# Patient Record
Sex: FEMALE | Race: WHITE | NOT HISPANIC OR LATINO
[De-identification: names, ages, dates, MRNs, and addresses within clinical notes are randomized per-mention and may not be internally consistent; named-entity substitution may affect disease eponyms.]

---

## 2017-02-01 ENCOUNTER — RESULT REVIEW (OUTPATIENT)
Age: 37
End: 2017-02-01

## 2017-02-02 ENCOUNTER — OUTPATIENT (OUTPATIENT)
Dept: OUTPATIENT SERVICES | Facility: HOSPITAL | Age: 37
LOS: 1 days | End: 2017-02-02
Payer: COMMERCIAL

## 2017-02-02 DIAGNOSIS — C53.9 MALIGNANT NEOPLASM OF CERVIX UTERI, UNSPECIFIED: ICD-10-CM

## 2017-02-02 PROCEDURE — 88321 CONSLTJ&REPRT SLD PREP ELSWR: CPT

## 2017-02-08 PROBLEM — Z00.00 ENCOUNTER FOR PREVENTIVE HEALTH EXAMINATION: Status: ACTIVE | Noted: 2017-02-08

## 2017-02-13 ENCOUNTER — APPOINTMENT (OUTPATIENT)
Dept: MRI IMAGING | Facility: CLINIC | Age: 37
End: 2017-02-13

## 2017-02-13 ENCOUNTER — OUTPATIENT (OUTPATIENT)
Dept: OUTPATIENT SERVICES | Facility: HOSPITAL | Age: 37
LOS: 1 days | End: 2017-02-13

## 2017-02-13 DIAGNOSIS — Z00.8 ENCOUNTER FOR OTHER GENERAL EXAMINATION: ICD-10-CM

## 2017-02-15 ENCOUNTER — OUTPATIENT (OUTPATIENT)
Dept: OUTPATIENT SERVICES | Facility: HOSPITAL | Age: 37
LOS: 1 days | End: 2017-02-15
Payer: COMMERCIAL

## 2017-02-15 VITALS
RESPIRATION RATE: 16 BRPM | DIASTOLIC BLOOD PRESSURE: 60 MMHG | HEART RATE: 64 BPM | TEMPERATURE: 98 F | SYSTOLIC BLOOD PRESSURE: 98 MMHG | WEIGHT: 185.19 LBS | HEIGHT: 67 IN

## 2017-02-15 DIAGNOSIS — Z98.890 OTHER SPECIFIED POSTPROCEDURAL STATES: Chronic | ICD-10-CM

## 2017-02-15 DIAGNOSIS — C53.9 MALIGNANT NEOPLASM OF CERVIX UTERI, UNSPECIFIED: ICD-10-CM

## 2017-02-15 DIAGNOSIS — Z01.818 ENCOUNTER FOR OTHER PREPROCEDURAL EXAMINATION: ICD-10-CM

## 2017-02-15 LAB
BASOPHILS # BLD AUTO: 0 K/UL — SIGNIFICANT CHANGE UP (ref 0–0.2)
BASOPHILS NFR BLD AUTO: 0.2 % — SIGNIFICANT CHANGE UP (ref 0–2)
BLD GP AB SCN SERPL QL: SIGNIFICANT CHANGE UP
EOSINOPHIL # BLD AUTO: 0.1 K/UL — SIGNIFICANT CHANGE UP (ref 0–0.5)
EOSINOPHIL NFR BLD AUTO: 0.8 % — SIGNIFICANT CHANGE UP (ref 0–6)
HCG SERPL-ACNC: <2 MIU/ML — SIGNIFICANT CHANGE UP
HCT VFR BLD CALC: 45.7 % — SIGNIFICANT CHANGE UP (ref 37–47)
HGB BLD-MCNC: 15.3 G/DL — SIGNIFICANT CHANGE UP (ref 12–16)
LYMPHOCYTES # BLD AUTO: 15.4 % — LOW (ref 20–55)
LYMPHOCYTES # BLD AUTO: 2.2 K/UL — SIGNIFICANT CHANGE UP (ref 1–4.8)
MCHC RBC-ENTMCNC: 31.5 PG — HIGH (ref 27–31)
MCHC RBC-ENTMCNC: 33.5 G/DL — SIGNIFICANT CHANGE UP (ref 32–36)
MCV RBC AUTO: 94.2 FL — SIGNIFICANT CHANGE UP (ref 81–99)
MONOCYTES # BLD AUTO: 0.9 K/UL — HIGH (ref 0–0.8)
MONOCYTES NFR BLD AUTO: 6 % — SIGNIFICANT CHANGE UP (ref 3–10)
NEUTROPHILS # BLD AUTO: 11.1 K/UL — HIGH (ref 1.8–8)
NEUTROPHILS NFR BLD AUTO: 77.3 % — HIGH (ref 37–73)
PLATELET # BLD AUTO: 335 K/UL — SIGNIFICANT CHANGE UP (ref 150–400)
RBC # BLD: 4.85 M/UL — SIGNIFICANT CHANGE UP (ref 4.4–5.2)
RBC # FLD: 12.7 % — SIGNIFICANT CHANGE UP (ref 11–15.6)
TYPE + AB SCN PNL BLD: SIGNIFICANT CHANGE UP
WBC # BLD: 14.3 K/UL — HIGH (ref 4.8–10.8)
WBC # FLD AUTO: 14.3 K/UL — HIGH (ref 4.8–10.8)

## 2017-02-15 PROCEDURE — 86850 RBC ANTIBODY SCREEN: CPT

## 2017-02-15 PROCEDURE — 86900 BLOOD TYPING SEROLOGIC ABO: CPT

## 2017-02-15 PROCEDURE — 85027 COMPLETE CBC AUTOMATED: CPT

## 2017-02-15 PROCEDURE — 84702 CHORIONIC GONADOTROPIN TEST: CPT

## 2017-02-15 PROCEDURE — 86901 BLOOD TYPING SEROLOGIC RH(D): CPT

## 2017-02-15 NOTE — H&P PST ADULT - NSANTHOSAYNRD_GEN_A_CORE
No. MATILDE screening performed.  STOP BANG Legend: 0-2 = LOW Risk; 3-4 = INTERMEDIATE Risk; 5-8 = HIGH Risk

## 2017-02-15 NOTE — H&P PST ADULT - HISTORY OF PRESENT ILLNESS
36F  with abnormal pap 36F  with abnormal pap, has had prior colposcopies and 1 prior conization. Now scheduled for Cold Knife Conization.

## 2017-02-15 NOTE — H&P PST ADULT - FAMILY HISTORY
Father  Still living? No  Family history of brain cancer, Age at diagnosis: 41-50     Sibling  Still living? Yes, Estimated age: Age Unknown  Family history of Hashimoto thyroiditis, Age at diagnosis: Age Unknown

## 2017-02-16 DIAGNOSIS — C53.9 MALIGNANT NEOPLASM OF CERVIX UTERI, UNSPECIFIED: ICD-10-CM

## 2017-02-16 DIAGNOSIS — Z01.818 ENCOUNTER FOR OTHER PREPROCEDURAL EXAMINATION: ICD-10-CM

## 2017-02-27 ENCOUNTER — RESULT REVIEW (OUTPATIENT)
Age: 37
End: 2017-02-27

## 2017-02-28 ENCOUNTER — OUTPATIENT (OUTPATIENT)
Dept: OUTPATIENT SERVICES | Facility: HOSPITAL | Age: 37
LOS: 1 days | End: 2017-02-28
Payer: COMMERCIAL

## 2017-02-28 VITALS
WEIGHT: 182.1 LBS | OXYGEN SATURATION: 97 % | RESPIRATION RATE: 16 BRPM | HEIGHT: 67 IN | TEMPERATURE: 37 F | SYSTOLIC BLOOD PRESSURE: 114 MMHG | HEART RATE: 82 BPM | DIASTOLIC BLOOD PRESSURE: 50 MMHG

## 2017-02-28 VITALS
HEART RATE: 74 BPM | OXYGEN SATURATION: 100 % | TEMPERATURE: 97 F | SYSTOLIC BLOOD PRESSURE: 107 MMHG | RESPIRATION RATE: 14 BRPM | DIASTOLIC BLOOD PRESSURE: 57 MMHG

## 2017-02-28 DIAGNOSIS — Z98.890 OTHER SPECIFIED POSTPROCEDURAL STATES: Chronic | ICD-10-CM

## 2017-02-28 DIAGNOSIS — C53.9 MALIGNANT NEOPLASM OF CERVIX UTERI, UNSPECIFIED: ICD-10-CM

## 2017-02-28 LAB
BLD GP AB SCN SERPL QL: SIGNIFICANT CHANGE UP
TYPE + AB SCN PNL BLD: SIGNIFICANT CHANGE UP

## 2017-02-28 PROCEDURE — 86901 BLOOD TYPING SEROLOGIC RH(D): CPT

## 2017-02-28 PROCEDURE — 86850 RBC ANTIBODY SCREEN: CPT

## 2017-02-28 PROCEDURE — 57520 CONIZATION OF CERVIX: CPT

## 2017-02-28 PROCEDURE — 88341 IMHCHEM/IMCYTCHM EA ADD ANTB: CPT

## 2017-02-28 PROCEDURE — 88307 TISSUE EXAM BY PATHOLOGIST: CPT | Mod: 26

## 2017-02-28 PROCEDURE — 36415 COLL VENOUS BLD VENIPUNCTURE: CPT

## 2017-02-28 PROCEDURE — 88307 TISSUE EXAM BY PATHOLOGIST: CPT

## 2017-02-28 PROCEDURE — 88341 IMHCHEM/IMCYTCHM EA ADD ANTB: CPT | Mod: 26

## 2017-02-28 PROCEDURE — 88342 IMHCHEM/IMCYTCHM 1ST ANTB: CPT | Mod: 26

## 2017-02-28 PROCEDURE — 86900 BLOOD TYPING SEROLOGIC ABO: CPT

## 2017-02-28 PROCEDURE — 88342 IMHCHEM/IMCYTCHM 1ST ANTB: CPT

## 2017-02-28 RX ORDER — SODIUM CHLORIDE 9 MG/ML
3 INJECTION INTRAMUSCULAR; INTRAVENOUS; SUBCUTANEOUS ONCE
Qty: 0 | Refills: 0 | Status: DISCONTINUED | OUTPATIENT
Start: 2017-02-28 | End: 2017-02-28

## 2017-02-28 RX ORDER — ONDANSETRON 8 MG/1
4 TABLET, FILM COATED ORAL ONCE
Qty: 0 | Refills: 0 | Status: DISCONTINUED | OUTPATIENT
Start: 2017-02-28 | End: 2017-02-28

## 2017-02-28 RX ORDER — FENTANYL CITRATE 50 UG/ML
50 INJECTION INTRAVENOUS
Qty: 0 | Refills: 0 | Status: DISCONTINUED | OUTPATIENT
Start: 2017-02-28 | End: 2017-02-28

## 2017-02-28 RX ORDER — SODIUM CHLORIDE 9 MG/ML
1000 INJECTION, SOLUTION INTRAVENOUS
Qty: 0 | Refills: 0 | Status: DISCONTINUED | OUTPATIENT
Start: 2017-02-28 | End: 2017-02-28

## 2017-02-28 RX ORDER — OXYCODONE HYDROCHLORIDE 5 MG/1
1 TABLET ORAL
Qty: 12 | Refills: 0
Start: 2017-02-28 | End: 2017-03-03

## 2017-02-28 RX ORDER — CEFAZOLIN SODIUM 1 G
2000 VIAL (EA) INJECTION ONCE
Qty: 0 | Refills: 0 | Status: COMPLETED | OUTPATIENT
Start: 2017-02-28 | End: 2017-02-28

## 2017-02-28 RX ADMIN — FENTANYL CITRATE 50 MICROGRAM(S): 50 INJECTION INTRAVENOUS at 10:31

## 2017-02-28 RX ADMIN — FENTANYL CITRATE 50 MICROGRAM(S): 50 INJECTION INTRAVENOUS at 10:25

## 2017-02-28 RX ADMIN — Medication 100 MILLIGRAM(S): at 09:35

## 2017-02-28 NOTE — ASU DISCHARGE PLAN (ADULT/PEDIATRIC). - MEDICATION SUMMARY - MEDICATIONS TO TAKE
I will START or STAY ON the medications listed below when I get home from the hospital:    acetaminophen-oxyCODONE 325 mg-5 mg oral tablet  -- 1 tab(s) by mouth every 6 hours, As Needed -for severe pain MDD:4 tabs  -- Caution federal law prohibits the transfer of this drug to any person other  than the person for whom it was prescribed.  May cause drowsiness.  Alcohol may intensify this effect.  Use care when operating dangerous machinery.  This prescription cannot be refilled.  This product contains acetaminophen.  Do not use  with any other product containing acetaminophen to prevent possible liver damage.  Using more of this medication than prescribed may cause serious breathing problems.    -- Indication: For pain

## 2017-03-07 LAB — SURGICAL PATHOLOGY FINAL REPORT - CH: SIGNIFICANT CHANGE UP

## 2017-06-16 ENCOUNTER — RESULT REVIEW (OUTPATIENT)
Age: 37
End: 2017-06-16

## 2018-03-15 ENCOUNTER — RESULT REVIEW (OUTPATIENT)
Age: 38
End: 2018-03-15

## 2019-07-02 PROBLEM — K44.9 DIAPHRAGMATIC HERNIA WITHOUT OBSTRUCTION OR GANGRENE: Chronic | Status: ACTIVE | Noted: 2017-02-15

## 2019-07-10 ENCOUNTER — APPOINTMENT (OUTPATIENT)
Dept: GYNECOLOGIC ONCOLOGY | Facility: CLINIC | Age: 39
End: 2019-07-10
Payer: COMMERCIAL

## 2019-07-10 VITALS
RESPIRATION RATE: 16 BRPM | HEIGHT: 68 IN | DIASTOLIC BLOOD PRESSURE: 83 MMHG | HEART RATE: 101 BPM | OXYGEN SATURATION: 95 % | BODY MASS INDEX: 30.31 KG/M2 | WEIGHT: 200 LBS | SYSTOLIC BLOOD PRESSURE: 134 MMHG

## 2019-07-10 PROCEDURE — 99213 OFFICE O/P EST LOW 20 MIN: CPT

## 2019-07-10 NOTE — ASSESSMENT
[FreeTextEntry1] : I explained that she may be experiencing these symptoms during her cycle due to scaring from her prior cone. Her pelvic exam is normal. Assuming this pap from today is negative I have asked her to return to my office in January for a f/u pap.

## 2019-07-10 NOTE — REASON FOR VISIT
[FreeTextEntry1] : [facility]\par \par [facility] [phone] at [address] [place] [id number]\par

## 2019-07-10 NOTE — END OF VISIT
[FreeTextEntry3] : Written by Whitley Wright, acting as a scribe for Dr. Tj Barrientos.\par This note accurately reflects the work and decision made by me.\par

## 2019-07-10 NOTE — HISTORY OF PRESENT ILLNESS
[FreeTextEntry1] : 37 y/o s/p cold knife conization for micro-invasive cervical cancer on 02/28/17, last seen in my office on 03/15/18 returns today for a pap smear. Pap from 03/2018 was negative, HPV negative. Patient states her mother  passed away of cancer which is why she is over due for her appointment.  She complains of pressure at the start of her menstrual cycle with light bleeding, until the second or third day when it becomes heavy.

## 2019-07-10 NOTE — PROCEDURE
[Cervical Pap] : cervical pap smear  [Other: ___] : [unfilled] [Verbal Consent] : verbal consent was obtained prior to the procedure and is detailed in the patient's record [Patient] : the patient

## 2019-07-10 NOTE — PHYSICAL EXAM
[Normal] : Adnexa(ae): Normal [de-identified] : Whitley Wright MA was present the entire time of gynecological exam

## 2019-07-11 LAB — HPV HIGH+LOW RISK DNA PNL CVX: NOT DETECTED

## 2019-07-15 LAB — CYTOLOGY CVX/VAG DOC THIN PREP: NORMAL

## 2019-10-19 ENCOUNTER — TRANSCRIPTION ENCOUNTER (OUTPATIENT)
Age: 39
End: 2019-10-19

## 2019-10-30 ENCOUNTER — TRANSCRIPTION ENCOUNTER (OUTPATIENT)
Age: 39
End: 2019-10-30

## 2020-03-04 ENCOUNTER — APPOINTMENT (OUTPATIENT)
Dept: GYNECOLOGIC ONCOLOGY | Facility: CLINIC | Age: 40
End: 2020-03-04

## 2021-02-25 ENCOUNTER — APPOINTMENT (OUTPATIENT)
Dept: GYNECOLOGIC ONCOLOGY | Facility: CLINIC | Age: 41
End: 2021-02-25
Payer: MEDICAID

## 2021-02-25 VITALS — BODY MASS INDEX: 30.31 KG/M2 | WEIGHT: 200 LBS | HEIGHT: 68 IN

## 2021-02-25 DIAGNOSIS — Z78.9 OTHER SPECIFIED HEALTH STATUS: ICD-10-CM

## 2021-02-25 PROCEDURE — 76857 US EXAM PELVIC LIMITED: CPT | Mod: 59

## 2021-02-25 PROCEDURE — 93976 VASCULAR STUDY: CPT | Mod: 59

## 2021-02-25 PROCEDURE — 99215 OFFICE O/P EST HI 40 MIN: CPT | Mod: 25

## 2021-02-25 PROCEDURE — 99072 ADDL SUPL MATRL&STAF TM PHE: CPT

## 2021-02-25 PROCEDURE — 76830 TRANSVAGINAL US NON-OB: CPT | Mod: 59

## 2021-03-01 PROBLEM — Z78.9 PATIENT DENIES MEDICAL PROBLEMS: Status: RESOLVED | Noted: 2021-03-01 | Resolved: 2021-03-01

## 2021-03-01 LAB — HPV HIGH+LOW RISK DNA PNL CVX: NOT DETECTED

## 2021-03-01 NOTE — PROCEDURE
[Cervical Pap] : cervical pap smear  [Other: ___] : [unfilled] [Patient] : the patient [Verbal Consent] : verbal consent was obtained prior to the procedure and is detailed in the patient's record

## 2021-03-01 NOTE — HISTORY OF PRESENT ILLNESS
[FreeTextEntry1] : 39yo nulligravida pt with LMP on 2/6/21 known to our service s/p CKC for micro-invasive cervical cancer in February 2017 with Dr. Barrientos. Patient overdue for surveillance, last seen July 2019 where Cervical PAP was performed and WNL. She presents today to see Dr. Collier due to change in insurance and to continue established surveillance care. She reports since last being seen she developed pelvic pain that began in November of 2020. She underwent CT scan of the pelvis on 2/18/21 revealing an enlarged uterus that has lobular contour on the basis of uterine fibroids, no measurements provided. She denies any abnormal bleeding or issues with bowel/bladder habits. \par \par LMammo- plans to get this year\par

## 2021-03-01 NOTE — PHYSICAL EXAM
[Normal] : Bimanual Exam: Normal [Abnormal] : Uterus: Abnormal [de-identified] : large fibroid uterus [de-identified] : Patient was interviewed and examined with chaperone present. Name of Chaperone: Nataliya Shahid PA-C

## 2021-03-01 NOTE — CHIEF COMPLAINT
[FreeTextEntry1] : [facility]\par \par [facility] [phone] at [address] [facility] [address]\par

## 2021-03-01 NOTE — END OF VISIT
[FreeTextEntry3] : Written by Yary Shahid PA-C, acting as scribe for Dr. Liz Collier.\par  [FreeTextEntry2] : This note accurately reflects the work and decisions made by me.\par

## 2021-03-01 NOTE — REVIEW OF SYSTEMS
[Negative] : Musculoskeletal [Hematuria] : no hematuria [Dysuria] : no dysuria [Vaginal Discharge] : no vaginal discharge [Abn Vag Bleeding] : no abnormal vaginal bleeding [Dyspareunia] : no dyspareunia [Incontinence] : no incontinence [FreeTextEntry4] : pelvic pain

## 2021-03-01 NOTE — ASSESSMENT
[FreeTextEntry1] : 41yo s/p CKC for microinvasive cervical cancer in February 2017. \par \par Now with reported pelvic pain x 4 months and fibroid uterus.\par \par I discussed at length with the patient the nature, purpose, risks, benefits, and alternatives of total abdominal hysterectomy and bilateral salpingectomy via a vertical, midline incision.  The patient understands the risks to include (but not be limited to) bowel injury, bleeding (with the possible need for transfusion), bladder or ureteral injury, infections, protracted wound closure, deep venous thrombosis, and sandie-operative death.  She is also aware of  the possibility of  a unrecognized surgical complication with need for subsequent re-exploration.  She understands the implications that removing both ovaries may have on the quality of her life.  She agrees to proceed.  She asked numerous questions which were answered to her satisfaction.  She understands the need for a pre-operative bowel preparation and agrees to comply with our instructions.\par \par

## 2021-03-04 LAB — CYTOLOGY CVX/VAG DOC THIN PREP: NORMAL

## 2021-03-05 ENCOUNTER — OUTPATIENT (OUTPATIENT)
Dept: OUTPATIENT SERVICES | Facility: HOSPITAL | Age: 41
LOS: 1 days | End: 2021-03-05
Payer: MEDICAID

## 2021-03-05 VITALS
WEIGHT: 220.46 LBS | HEIGHT: 68 IN | RESPIRATION RATE: 19 BRPM | HEART RATE: 101 BPM | SYSTOLIC BLOOD PRESSURE: 135 MMHG | TEMPERATURE: 97 F | DIASTOLIC BLOOD PRESSURE: 84 MMHG

## 2021-03-05 DIAGNOSIS — Z01.818 ENCOUNTER FOR OTHER PREPROCEDURAL EXAMINATION: ICD-10-CM

## 2021-03-05 DIAGNOSIS — Z13.89 ENCOUNTER FOR SCREENING FOR OTHER DISORDER: ICD-10-CM

## 2021-03-05 DIAGNOSIS — Z98.890 OTHER SPECIFIED POSTPROCEDURAL STATES: Chronic | ICD-10-CM

## 2021-03-05 DIAGNOSIS — Z90.89 ACQUIRED ABSENCE OF OTHER ORGANS: Chronic | ICD-10-CM

## 2021-03-05 DIAGNOSIS — Z29.9 ENCOUNTER FOR PROPHYLACTIC MEASURES, UNSPECIFIED: ICD-10-CM

## 2021-03-05 DIAGNOSIS — D25.9 LEIOMYOMA OF UTERUS, UNSPECIFIED: ICD-10-CM

## 2021-03-05 LAB
ALBUMIN SERPL ELPH-MCNC: 4.5 G/DL — SIGNIFICANT CHANGE UP (ref 3.3–5.2)
ALP SERPL-CCNC: 124 U/L — HIGH (ref 40–120)
ALT FLD-CCNC: 14 U/L — SIGNIFICANT CHANGE UP
ANION GAP SERPL CALC-SCNC: 13 MMOL/L — SIGNIFICANT CHANGE UP (ref 5–17)
AST SERPL-CCNC: 18 U/L — SIGNIFICANT CHANGE UP
BASOPHILS # BLD AUTO: 0.07 K/UL — SIGNIFICANT CHANGE UP (ref 0–0.2)
BASOPHILS NFR BLD AUTO: 0.5 % — SIGNIFICANT CHANGE UP (ref 0–2)
BILIRUB SERPL-MCNC: 0.3 MG/DL — LOW (ref 0.4–2)
BLD GP AB SCN SERPL QL: SIGNIFICANT CHANGE UP
BUN SERPL-MCNC: 12 MG/DL — SIGNIFICANT CHANGE UP (ref 8–20)
CALCIUM SERPL-MCNC: 9.4 MG/DL — SIGNIFICANT CHANGE UP (ref 8.6–10.2)
CHLORIDE SERPL-SCNC: 104 MMOL/L — SIGNIFICANT CHANGE UP (ref 98–107)
CO2 SERPL-SCNC: 24 MMOL/L — SIGNIFICANT CHANGE UP (ref 22–29)
CREAT SERPL-MCNC: 0.62 MG/DL — SIGNIFICANT CHANGE UP (ref 0.5–1.3)
EOSINOPHIL # BLD AUTO: 0.13 K/UL — SIGNIFICANT CHANGE UP (ref 0–0.5)
EOSINOPHIL NFR BLD AUTO: 1 % — SIGNIFICANT CHANGE UP (ref 0–6)
GLUCOSE SERPL-MCNC: 91 MG/DL — SIGNIFICANT CHANGE UP (ref 70–99)
HCG SERPL-ACNC: <4 MIU/ML — SIGNIFICANT CHANGE UP
HCT VFR BLD CALC: 48.5 % — HIGH (ref 34.5–45)
HGB BLD-MCNC: 15.8 G/DL — HIGH (ref 11.5–15.5)
IMM GRANULOCYTES NFR BLD AUTO: 0.5 % — SIGNIFICANT CHANGE UP (ref 0–1.5)
LYMPHOCYTES # BLD AUTO: 1.89 K/UL — SIGNIFICANT CHANGE UP (ref 1–3.3)
LYMPHOCYTES # BLD AUTO: 14.5 % — SIGNIFICANT CHANGE UP (ref 13–44)
MCHC RBC-ENTMCNC: 31 PG — SIGNIFICANT CHANGE UP (ref 27–34)
MCHC RBC-ENTMCNC: 32.6 GM/DL — SIGNIFICANT CHANGE UP (ref 32–36)
MCV RBC AUTO: 95.1 FL — SIGNIFICANT CHANGE UP (ref 80–100)
MONOCYTES # BLD AUTO: 0.55 K/UL — SIGNIFICANT CHANGE UP (ref 0–0.9)
MONOCYTES NFR BLD AUTO: 4.2 % — SIGNIFICANT CHANGE UP (ref 2–14)
NEUTROPHILS # BLD AUTO: 10.28 K/UL — HIGH (ref 1.8–7.4)
NEUTROPHILS NFR BLD AUTO: 79.3 % — HIGH (ref 43–77)
PLATELET # BLD AUTO: 332 K/UL — SIGNIFICANT CHANGE UP (ref 150–400)
POTASSIUM SERPL-MCNC: 4.5 MMOL/L — SIGNIFICANT CHANGE UP (ref 3.5–5.3)
POTASSIUM SERPL-SCNC: 4.5 MMOL/L — SIGNIFICANT CHANGE UP (ref 3.5–5.3)
PROT SERPL-MCNC: 7.4 G/DL — SIGNIFICANT CHANGE UP (ref 6.6–8.7)
RBC # BLD: 5.1 M/UL — SIGNIFICANT CHANGE UP (ref 3.8–5.2)
RBC # FLD: 12.6 % — SIGNIFICANT CHANGE UP (ref 10.3–14.5)
SODIUM SERPL-SCNC: 140 MMOL/L — SIGNIFICANT CHANGE UP (ref 135–145)
WBC # BLD: 12.99 K/UL — HIGH (ref 3.8–10.5)
WBC # FLD AUTO: 12.99 K/UL — HIGH (ref 3.8–10.5)

## 2021-03-05 PROCEDURE — 71046 X-RAY EXAM CHEST 2 VIEWS: CPT | Mod: 26

## 2021-03-05 PROCEDURE — G0463: CPT

## 2021-03-05 PROCEDURE — 93005 ELECTROCARDIOGRAM TRACING: CPT

## 2021-03-05 PROCEDURE — 93010 ELECTROCARDIOGRAM REPORT: CPT

## 2021-03-05 PROCEDURE — 71046 X-RAY EXAM CHEST 2 VIEWS: CPT

## 2021-03-05 RX ORDER — CEFAZOLIN SODIUM 1 G
2000 VIAL (EA) INJECTION ONCE
Refills: 0 | Status: DISCONTINUED | OUTPATIENT
Start: 2021-03-18 | End: 2021-03-20

## 2021-03-05 RX ORDER — METRONIDAZOLE 500 MG
500 TABLET ORAL ONCE
Refills: 0 | Status: DISCONTINUED | OUTPATIENT
Start: 2021-03-18 | End: 2021-03-20

## 2021-03-05 NOTE — H&P PST ADULT - NSICDXFAMILYHX_GEN_ALL_CORE_FT
FAMILY HISTORY:  Father  Still living? No  Family history of brain cancer, Age at diagnosis: 41-50    Sibling  Still living? Yes, Estimated age: Age Unknown  Family history of Hashimoto thyroiditis, Age at diagnosis: Age Unknown     FAMILY HISTORY:  Father  Still living? No  Family history of brain cancer, Age at diagnosis: 41-50    Mother  Still living? No  FH: lung cancer, Age at diagnosis: Age Unknown    Sibling  Still living? Yes, Estimated age: Age Unknown  Family history of Hashimoto thyroiditis, Age at diagnosis: Age Unknown

## 2021-03-05 NOTE — H&P PST ADULT - NSICDXPROBLEM_GEN_ALL_CORE_FT
PROBLEM DIAGNOSES  Problem: Leiomyoma of uterus  Assessment and Plan: Scheduled for total abdominal hysterectomy, bilateral salpingectomy on 3/18/2021  Medical clearance     Problem: Need for prophylactic measure  Assessment and Plan: Caprini 3- Moderate risk  Primary team please assess need for DVT prophylaxis     Problem: Screening for substance abuse  Assessment and Plan: ORT 1

## 2021-03-05 NOTE — H&P PST ADULT - ASSESSMENT
41 y/o female scheduled for total abdomina; hysterectyomy, bilateral salpingectomy 3/18/2021 41 y/o female scheduled for total abdomina; hysterectyomy, bilateral salpingectomy 3/18/2021      OPIOID RISK TOOL    DOMENICO EACH BOX THAT APPLIES AND ADD TOTALS AT THE END    FAMILY HISTORY OF SUBSTANCE ABUSE                 FEMALE         MALE                                                Alcohol                             [  ]1 pt          [  ]3pts                                               Illegal Durgs                     [  ]2 pts        [  ]3pts                                               Rx Drugs                           [  ]4 pts        [  ]4 pts    PERSONAL HISTORY OF SUBSTANCE ABUSE                                                                                          Alcohol                             [  ]3 pts       [  ]3 pts                                               Illegal Drugs                     [  ]4 pts        [  ]4 pts                                               Rx Drugs                           [  ]5 pts        [  ]5 pts    AGE BETWEEN 16-45 YEARS                                      [x  ]1 pt         [  ]1 pt    HISTORY OF PREADOLESCENT   SEXUAL ABUSE                                                             [  ]3 pts        [  ]0pts    PSYCHOLOGICAL DISEASE                     ADD, OCD, Bipolar, Schizophrenia        [  ]2 pts         [  ]2 pts                      Depression                                               [  ]1 pt           [  ]1 pt           SCORING TOTAL   (add numbers and type here)              (1)                                     A score of 3 or lower indicated LOW risk for future opioid abuse  A score of 4 to 7 indicated moderate risk for future opioid abuse  A score of 8 or higher indicates a high risk for opioid abuse    CAPRINI SCORE [CLOT]    AGE RELATED RISK FACTORS                                                       MOBILITY RELATED FACTORS  [ ] Age 41-60 years                                            (1 Point)                  [ ] Bed rest                                                        (1 Point)  [ ] Age: 61-74 years                                           (2 Points)                 [ ] Plaster cast                                                   (2 Points)  [ ] Age= 75 years                                              (3 Points)                 [ ] Bed bound for more than 72 hours                 (2 Points)    DISEASE RELATED RISK FACTORS                                               GENDER SPECIFIC FACTORS  [x ] Edema in the lower extremities                       (1 Point)                  [ ] Pregnancy                                                     (1 Point)  [ ] Varicose veins                                               (1 Point)                  [ ] Post-partum < 6 weeks                                   (1 Point)             [x ] BMI > 25 Kg/m2                                            (1 Point)                  [ ] Hormonal therapy  or oral contraception          (1 Point)                 [ ] Sepsis (in the previous month)                        (1 Point)                  [ ] History of pregnancy complications                 (1 point)  [ ] Pneumonia or serious lung disease                                               [ ] Unexplained or recurrent                     (1 Point)           (in the previous month)                               (1 Point)  [ ] Abnormal pulmonary function test                     (1 Point)                 SURGERY RELATED RISK FACTORS  [ ] Acute myocardial infarction                              (1 Point)                 [ ]  Section                                             (1 Point)  [ ] Congestive heart failure (in the previous month)  (1 Point)               [ ] Minor surgery                                                  (1 Point)   [ ] Inflammatory bowel disease                             (1 Point)                 [ ] Arthroscopic surgery                                        (2 Points)  [ ] Central venous access                                      (2 Points)                [x ] General surgery lasting more than 45 minutes   (2 Points)       [ ] Stroke (in the previous month)                          (5 Points)               [ ] Elective arthroplasty                                         (5 Points)                                                                                                                                               HEMATOLOGY RELATED FACTORS                                                 TRAUMA RELATED RISK FACTORS  [ ] Prior episodes of VTE                                     (3 Points)                [ ] Fracture of the hip, pelvis, or leg                       (5 Points)  [ ] Positive family history for VTE                         (3 Points)                 [ ] Acute spinal cord injury (in the previous month)  (5 Points)  [ ] Prothrombin 68581 A                                     (3 Points)                 [ ] Paralysis  (less than 1 month)                             (5 Points)  [ ] Factor V Leiden                                             (3 Points)                  [ ] Multiple Trauma within 1 month                        (5 Points)  [ ] Lupus anticoagulants                                     (3 Points)                                                           [ ] Anticardiolipin antibodies                               (3 Points)                                                       [ ] High homocysteine in the blood                      (3 Points)                                             [ ] Other congenital or acquired thrombophilia      (3 Points)                                                [ ] Heparin induced thrombocytopenia                  (3 Points)                                          Total Score [  3        ]    Caprini Score 0 - 2:  Low Risk, No VTE Prophylaxis required for most patients, encourage ambulation  Caprini Score 3 - 6:  At Risk, pharmacologic VTE prophylaxis is indicated for most patients (in the absence of a contraindication)  Caprini Score Greater than or = 7:  High Risk, pharmacologic VTE prophylaxis is indicated for most patients (in the absence of a contraindication) 39 y/o female scheduled for total abdomina; hysterectyomy, bilateral salpingectomy 3/18/2021. Pt called today, notified of dental clearance as per anesthesiologist request due to pt report of loose tooth. Dr. Collier's office also notified spoke to Phoebe today 3/8/21. Pt also scheduled for PCP apt on 3/9/21.      OPIOID RISK TOOL    DOMENICO EACH BOX THAT APPLIES AND ADD TOTALS AT THE END    FAMILY HISTORY OF SUBSTANCE ABUSE                 FEMALE         MALE                                                Alcohol                             [  ]1 pt          [  ]3pts                                               Illegal Durgs                     [  ]2 pts        [  ]3pts                                               Rx Drugs                           [  ]4 pts        [  ]4 pts    PERSONAL HISTORY OF SUBSTANCE ABUSE                                                                                          Alcohol                             [  ]3 pts       [  ]3 pts                                               Illegal Drugs                     [  ]4 pts        [  ]4 pts                                               Rx Drugs                           [  ]5 pts        [  ]5 pts    AGE BETWEEN 16-45 YEARS                                      [x  ]1 pt         [  ]1 pt    HISTORY OF PREADOLESCENT   SEXUAL ABUSE                                                             [  ]3 pts        [  ]0pts    PSYCHOLOGICAL DISEASE                     ADD, OCD, Bipolar, Schizophrenia        [  ]2 pts         [  ]2 pts                      Depression                                               [  ]1 pt           [  ]1 pt           SCORING TOTAL   (add numbers and type here)              (1)                                     A score of 3 or lower indicated LOW risk for future opioid abuse  A score of 4 to 7 indicated moderate risk for future opioid abuse  A score of 8 or higher indicates a high risk for opioid abuse    CAPRINI SCORE [CLOT]    AGE RELATED RISK FACTORS                                                       MOBILITY RELATED FACTORS  [ ] Age 41-60 years                                            (1 Point)                  [ ] Bed rest                                                        (1 Point)  [ ] Age: 61-74 years                                           (2 Points)                 [ ] Plaster cast                                                   (2 Points)  [ ] Age= 75 years                                              (3 Points)                 [ ] Bed bound for more than 72 hours                 (2 Points)    DISEASE RELATED RISK FACTORS                                               GENDER SPECIFIC FACTORS  [x ] Edema in the lower extremities                       (1 Point)                  [ ] Pregnancy                                                     (1 Point)  [ ] Varicose veins                                               (1 Point)                  [ ] Post-partum < 6 weeks                                   (1 Point)             [x ] BMI > 25 Kg/m2                                            (1 Point)                  [ ] Hormonal therapy  or oral contraception          (1 Point)                 [ ] Sepsis (in the previous month)                        (1 Point)                  [ ] History of pregnancy complications                 (1 point)  [ ] Pneumonia or serious lung disease                                               [ ] Unexplained or recurrent                     (1 Point)           (in the previous month)                               (1 Point)  [ ] Abnormal pulmonary function test                     (1 Point)                 SURGERY RELATED RISK FACTORS  [ ] Acute myocardial infarction                              (1 Point)                 [ ]  Section                                             (1 Point)  [ ] Congestive heart failure (in the previous month)  (1 Point)               [ ] Minor surgery                                                  (1 Point)   [ ] Inflammatory bowel disease                             (1 Point)                 [ ] Arthroscopic surgery                                        (2 Points)  [ ] Central venous access                                      (2 Points)                [x ] General surgery lasting more than 45 minutes   (2 Points)       [ ] Stroke (in the previous month)                          (5 Points)               [ ] Elective arthroplasty                                         (5 Points)                                                                                                                                               HEMATOLOGY RELATED FACTORS                                                 TRAUMA RELATED RISK FACTORS  [ ] Prior episodes of VTE                                     (3 Points)                [ ] Fracture of the hip, pelvis, or leg                       (5 Points)  [ ] Positive family history for VTE                         (3 Points)                 [ ] Acute spinal cord injury (in the previous month)  (5 Points)  [ ] Prothrombin 36647 A                                     (3 Points)                 [ ] Paralysis  (less than 1 month)                             (5 Points)  [ ] Factor V Leiden                                             (3 Points)                  [ ] Multiple Trauma within 1 month                        (5 Points)  [ ] Lupus anticoagulants                                     (3 Points)                                                           [ ] Anticardiolipin antibodies                               (3 Points)                                                       [ ] High homocysteine in the blood                      (3 Points)                                             [ ] Other congenital or acquired thrombophilia      (3 Points)                                                [ ] Heparin induced thrombocytopenia                  (3 Points)                                          Total Score [  3        ]    Caprini Score 0 - 2:  Low Risk, No VTE Prophylaxis required for most patients, encourage ambulation  Caprini Score 3 - 6:  At Risk, pharmacologic VTE prophylaxis is indicated for most patients (in the absence of a contraindication)  Caprini Score Greater than or = 7:  High Risk, pharmacologic VTE prophylaxis is indicated for most patients (in the absence of a contraindication)

## 2021-03-05 NOTE — H&P PST ADULT - HISTORY OF PRESENT ILLNESS
39 y/o female presents to PST today. Patient reports a history of  39 y/o female presents to [facility] today. Patient reports a history of pelvic pressure and pain spast 6 months and have been following up. Patient was diagnosed with fibroids. Denies any abnormal vaginal bleeding. Last mestrual cycle 3/1/2021. Denies any fever, chills chest pain SON 41 y/o female presents to [facility] today. Patient reports a history of pelvic pressure and pain for the past 6 months and has been following up. Patient was recently diagnosed with fibroid February 2021. Denies any abnormal vaginal bleeding. Last menstrual cycle 3/1/2021. Denies any fever, chills chest pain, SOB. Scheduled for total abdominal hysterectomy, bilateral salpingectomy 3/18/2021.

## 2021-03-05 NOTE — H&P PST ADULT - LAB RESULTS AND INTERPRETATION
Abnormal lab results reported to Dr. Collier 3/5/2021 Abnormal lab results reported to Dr. Collier 3/5/2021  3/8/21: Pt PCP office called today, notified of abnormal lab results, spoke with Matilda in PCP office, copies of lab faxed.

## 2021-03-05 NOTE — H&P PST ADULT - NSICDXPASTSURGICALHX_GEN_ALL_CORE_FT
PAST SURGICAL HISTORY:  History of conization of cervix 2017    History of dilation and curettage     History of eye surgery     History of tonsillectomy 1988

## 2021-03-14 DIAGNOSIS — Z01.818 ENCOUNTER FOR OTHER PREPROCEDURAL EXAMINATION: ICD-10-CM

## 2021-03-15 ENCOUNTER — APPOINTMENT (OUTPATIENT)
Dept: DISASTER EMERGENCY | Facility: CLINIC | Age: 41
End: 2021-03-15

## 2021-03-16 LAB — SARS-COV-2 N GENE NPH QL NAA+PROBE: NOT DETECTED

## 2021-03-17 ENCOUNTER — FORM ENCOUNTER (OUTPATIENT)
Age: 41
End: 2021-03-17

## 2021-03-18 ENCOUNTER — RESULT REVIEW (OUTPATIENT)
Age: 41
End: 2021-03-18

## 2021-03-18 ENCOUNTER — INPATIENT (INPATIENT)
Facility: HOSPITAL | Age: 41
LOS: 1 days | Discharge: HOME | DRG: 743 | End: 2021-03-20
Attending: OBSTETRICS & GYNECOLOGY | Admitting: OBSTETRICS & GYNECOLOGY
Payer: MEDICAID

## 2021-03-18 VITALS
RESPIRATION RATE: 15 BRPM | DIASTOLIC BLOOD PRESSURE: 56 MMHG | SYSTOLIC BLOOD PRESSURE: 130 MMHG | TEMPERATURE: 98 F | HEIGHT: 68 IN | WEIGHT: 220.46 LBS | OXYGEN SATURATION: 96 % | HEART RATE: 88 BPM

## 2021-03-18 DIAGNOSIS — D25.9 LEIOMYOMA OF UTERUS, UNSPECIFIED: ICD-10-CM

## 2021-03-18 DIAGNOSIS — Z98.890 OTHER SPECIFIED POSTPROCEDURAL STATES: Chronic | ICD-10-CM

## 2021-03-18 DIAGNOSIS — Z90.89 ACQUIRED ABSENCE OF OTHER ORGANS: Chronic | ICD-10-CM

## 2021-03-18 LAB
GLUCOSE BLDC GLUCOMTR-MCNC: 76 MG/DL — SIGNIFICANT CHANGE UP (ref 70–99)
GLUCOSE BLDC GLUCOMTR-MCNC: 86 MG/DL — SIGNIFICANT CHANGE UP (ref 70–99)
GLUCOSE BLDC GLUCOMTR-MCNC: 92 MG/DL — SIGNIFICANT CHANGE UP (ref 70–99)

## 2021-03-18 PROCEDURE — 88305 TISSUE EXAM BY PATHOLOGIST: CPT | Mod: 26

## 2021-03-18 PROCEDURE — 88307 TISSUE EXAM BY PATHOLOGIST: CPT | Mod: 26

## 2021-03-18 PROCEDURE — 58150 TOTAL HYSTERECTOMY: CPT | Mod: 80

## 2021-03-18 PROCEDURE — 58150 TOTAL HYSTERECTOMY: CPT

## 2021-03-18 RX ORDER — ENOXAPARIN SODIUM 100 MG/ML
40 INJECTION SUBCUTANEOUS DAILY
Refills: 0 | Status: DISCONTINUED | OUTPATIENT
Start: 2021-03-18 | End: 2021-03-20

## 2021-03-18 RX ORDER — CEFAZOLIN SODIUM 1 G
2000 VIAL (EA) INJECTION ONCE
Refills: 0 | Status: DISCONTINUED | OUTPATIENT
Start: 2021-03-18 | End: 2021-03-18

## 2021-03-18 RX ORDER — SODIUM CHLORIDE 9 MG/ML
1000 INJECTION, SOLUTION INTRAVENOUS
Refills: 0 | Status: DISCONTINUED | OUTPATIENT
Start: 2021-03-18 | End: 2021-03-18

## 2021-03-18 RX ORDER — CEFAZOLIN SODIUM 1 G
2000 VIAL (EA) INJECTION ONCE
Refills: 0 | Status: COMPLETED | OUTPATIENT
Start: 2021-03-18 | End: 2021-03-18

## 2021-03-18 RX ORDER — ONDANSETRON 8 MG/1
4 TABLET, FILM COATED ORAL EVERY 6 HOURS
Refills: 0 | Status: DISCONTINUED | OUTPATIENT
Start: 2021-03-18 | End: 2021-03-20

## 2021-03-18 RX ORDER — HYDROMORPHONE HYDROCHLORIDE 2 MG/ML
30 INJECTION INTRAMUSCULAR; INTRAVENOUS; SUBCUTANEOUS
Refills: 0 | Status: DISCONTINUED | OUTPATIENT
Start: 2021-03-18 | End: 2021-03-19

## 2021-03-18 RX ORDER — METRONIDAZOLE 500 MG
500 TABLET ORAL ONCE
Refills: 0 | Status: COMPLETED | OUTPATIENT
Start: 2021-03-18 | End: 2021-03-18

## 2021-03-18 RX ORDER — FENTANYL CITRATE 50 UG/ML
50 INJECTION INTRAVENOUS
Refills: 0 | Status: DISCONTINUED | OUTPATIENT
Start: 2021-03-18 | End: 2021-03-18

## 2021-03-18 RX ORDER — ACETAMINOPHEN 500 MG
975 TABLET ORAL EVERY 6 HOURS
Refills: 0 | Status: COMPLETED | OUTPATIENT
Start: 2021-03-18 | End: 2022-02-14

## 2021-03-18 RX ORDER — ONDANSETRON 8 MG/1
4 TABLET, FILM COATED ORAL EVERY 6 HOURS
Refills: 0 | Status: DISCONTINUED | OUTPATIENT
Start: 2021-03-18 | End: 2021-03-18

## 2021-03-18 RX ORDER — DEXTROSE MONOHYDRATE, SODIUM CHLORIDE, AND POTASSIUM CHLORIDE 50; .745; 4.5 G/1000ML; G/1000ML; G/1000ML
1000 INJECTION, SOLUTION INTRAVENOUS
Refills: 0 | Status: DISCONTINUED | OUTPATIENT
Start: 2021-03-18 | End: 2021-03-19

## 2021-03-18 RX ORDER — SODIUM CHLORIDE 9 MG/ML
3 INJECTION INTRAMUSCULAR; INTRAVENOUS; SUBCUTANEOUS EVERY 8 HOURS
Refills: 0 | Status: DISCONTINUED | OUTPATIENT
Start: 2021-03-18 | End: 2021-03-18

## 2021-03-18 RX ORDER — KETOROLAC TROMETHAMINE 30 MG/ML
30 SYRINGE (ML) INJECTION EVERY 6 HOURS
Refills: 0 | Status: DISCONTINUED | OUTPATIENT
Start: 2021-03-18 | End: 2021-03-20

## 2021-03-18 RX ORDER — NALOXONE HYDROCHLORIDE 4 MG/.1ML
0.1 SPRAY NASAL
Refills: 0 | Status: DISCONTINUED | OUTPATIENT
Start: 2021-03-18 | End: 2021-03-20

## 2021-03-18 RX ADMIN — Medication 30 MILLIGRAM(S): at 18:27

## 2021-03-18 RX ADMIN — Medication 100 MILLIGRAM(S): at 22:18

## 2021-03-18 RX ADMIN — FENTANYL CITRATE 50 MICROGRAM(S): 50 INJECTION INTRAVENOUS at 15:30

## 2021-03-18 RX ADMIN — Medication 30 MILLIGRAM(S): at 18:42

## 2021-03-18 RX ADMIN — HYDROMORPHONE HYDROCHLORIDE 30 MILLILITER(S): 2 INJECTION INTRAMUSCULAR; INTRAVENOUS; SUBCUTANEOUS at 19:08

## 2021-03-18 RX ADMIN — DEXTROSE MONOHYDRATE, SODIUM CHLORIDE, AND POTASSIUM CHLORIDE 125 MILLILITER(S): 50; .745; 4.5 INJECTION, SOLUTION INTRAVENOUS at 18:28

## 2021-03-18 RX ADMIN — FENTANYL CITRATE 50 MICROGRAM(S): 50 INJECTION INTRAVENOUS at 15:45

## 2021-03-18 RX ADMIN — ENOXAPARIN SODIUM 40 MILLIGRAM(S): 100 INJECTION SUBCUTANEOUS at 20:02

## 2021-03-18 RX ADMIN — HYDROMORPHONE HYDROCHLORIDE 30 MILLILITER(S): 2 INJECTION INTRAMUSCULAR; INTRAVENOUS; SUBCUTANEOUS at 16:40

## 2021-03-18 RX ADMIN — Medication 100 MILLIGRAM(S): at 20:02

## 2021-03-18 RX ADMIN — HYDROMORPHONE HYDROCHLORIDE 30 MILLILITER(S): 2 INJECTION INTRAMUSCULAR; INTRAVENOUS; SUBCUTANEOUS at 17:10

## 2021-03-18 RX ADMIN — HYDROMORPHONE HYDROCHLORIDE 30 MILLILITER(S): 2 INJECTION INTRAMUSCULAR; INTRAVENOUS; SUBCUTANEOUS at 15:43

## 2021-03-18 NOTE — BRIEF OPERATIVE NOTE - NSICDXBRIEFPROCEDURE_GEN_ALL_CORE_FT
PROCEDURES:  Oopherectomy 18-Mar-2021 15:08:27 left Savanna Martinez  Hysterectomy, total, abdominal, with salpingectomy 18-Mar-2021 15:08:03  Savanna Martinez

## 2021-03-18 NOTE — BRIEF OPERATIVE NOTE - OPERATION/FINDINGS
10 weeks sized uterus with fundal fibroid and a 10cm cervical fibroid, small posterior fibroids; b/l ovaries adhered to posterior cervix; endometriomas in bilateral ovaries, left more than right

## 2021-03-18 NOTE — CHART NOTE - NSCHARTNOTEFT_GEN_A_CORE
GYNECOLOGIC ONCOLOGY PROGRESS NOTE    POD# 0 s/p DANA BSLO for fibroid uterus w/ adherent L ovary.     PROBLEMS:  Leiomyoma of uterus  Need for prophylactic measure    Pt seen and examined at bedside in 2E    SUBJECTIVE:  Patient is without complaints.  Pain well-controlled on PCA  Flatus: no  Denies Nausea, Vomiting or Diarrhea.  Denies shortness of breath, chest pain or dyspnea on exertion.  Tolerating diet.    OBJECTIVE:     VITALS:  T(F): 98.1 (03-18-21 @ 19:32), Max: 98.6 (03-18-21 @ 17:05)  HR: 77 (03-18-21 @ 19:32) (73 - 92)  BP: 115/73 (03-18-21 @ 19:32) (103/62 - 130/56)  RR: 18 (03-18-21 @ 19:32) (10 - 18)  SpO2: 100% (03-18-21 @ 19:32) (95% - 100%)    I&O's Summary    18 Mar 2021 07:01  -  18 Mar 2021 20:55  --------------------------------------------------------  IN: 150 mL / OUT: 170 mL / NET: -20 mL      MEDICATIONS  (STANDING):  ceFAZolin   IVPB 2000 milliGRAM(s) IV Intermittent once  ceFAZolin  Injectable. 2000 milliGRAM(s) IV Push once  dextrose 5% + sodium chloride 0.45% with potassium chloride 20 mEq/L 1000 milliLiter(s) (125 mL/Hr) IV Continuous <Continuous>  enoxaparin Injectable 40 milliGRAM(s) SubCutaneous daily  HYDROmorphone PCA (1 mG/mL) 30 milliLiter(s) PCA Continuous PCA Continuous  ketorolac   Injectable 30 milliGRAM(s) IV Push every 6 hours  metroNIDAZOLE  IVPB 500 milliGRAM(s) IV Intermittent once    MEDICATIONS  (PRN):  acetaminophen   Tablet .. 975 milliGRAM(s) Oral every 6 hours PRN Mild Pain (1 - 3)  naloxone Injectable 0.1 milliGRAM(s) IV Push every 3 minutes PRN For ANY of the following changes in patient status:  A. RR LESS THAN 10 breaths per minute, B. Oxygen saturation LESS THAN 90%, C. Sedation score of 6  ondansetron Injectable 4 milliGRAM(s) IV Push every 6 hours PRN Nausea and/or Vomiting      Physical Exam:  Constitutional: NAD, AAOx3  Pulmonary: clear to auscultation bilaterally   Cardiovascular: Regular rate and rhythm   Abdomen: soft, non-tender, non-distended, normal bowel sounds  Extremities: no lower extremity edema or calve tenderness, Torito's sign negative.  Incision: Clean, dry, intact. Covered by island dressing. Without signs of infection or hernia.    LABS:  AM labs ordered    A/P - 41yo POD# 0 s/p DANA BSLO for fibroid uterus w/ adherent L ovary.   Cardiac: No cardiac issues, BP well controlled   Pulmonary: no active disease, incentive spirometer at bedside.   Neuro: pain well controlled with current regimen - PCA.  Endo: no active disease    GI: tolerating regular diet   : urena in place.  ID: ancef +flagl x1 post-op   Heme: SCDs when not ambulating, Lovenox for VTE prophylaxis. AM labs.   Skin: no active disease   Psych: no active problems   FEN: IVF at 125cc/hr, will discontinue once PO intake is adequate.  AM labs.

## 2021-03-18 NOTE — ASU PREOP CHECKLIST - DNR CLARIFICATION FORM COMPLETED
Pt [place] with mother from airport, pt states she was having an episode of dizziness and twitching during sleep. The patients friend states she found her friend \"out of it\" with her eyes rolling in the back of the head. Pt states she does not remember and of the events that happened on the plane.  Pt currently axo 4   n/a

## 2021-03-19 ENCOUNTER — TRANSCRIPTION ENCOUNTER (OUTPATIENT)
Age: 41
End: 2021-03-19

## 2021-03-19 LAB
ANION GAP SERPL CALC-SCNC: 12 MMOL/L — SIGNIFICANT CHANGE UP (ref 5–17)
BASOPHILS # BLD AUTO: 0 K/UL — SIGNIFICANT CHANGE UP (ref 0–0.2)
BASOPHILS NFR BLD AUTO: 0 % — SIGNIFICANT CHANGE UP (ref 0–2)
BUN SERPL-MCNC: 8 MG/DL — SIGNIFICANT CHANGE UP (ref 8–20)
CALCIUM SERPL-MCNC: 8.2 MG/DL — LOW (ref 8.6–10.2)
CHLORIDE SERPL-SCNC: 105 MMOL/L — SIGNIFICANT CHANGE UP (ref 98–107)
CO2 SERPL-SCNC: 20 MMOL/L — LOW (ref 22–29)
CREAT SERPL-MCNC: 0.59 MG/DL — SIGNIFICANT CHANGE UP (ref 0.5–1.3)
EOSINOPHIL # BLD AUTO: 0 K/UL — SIGNIFICANT CHANGE UP (ref 0–0.5)
EOSINOPHIL NFR BLD AUTO: 0 % — SIGNIFICANT CHANGE UP (ref 0–6)
GIANT PLATELETS BLD QL SMEAR: PRESENT — SIGNIFICANT CHANGE UP
GLUCOSE SERPL-MCNC: 157 MG/DL — HIGH (ref 70–99)
HCT VFR BLD CALC: 40.4 % — SIGNIFICANT CHANGE UP (ref 34.5–45)
HGB BLD-MCNC: 13.2 G/DL — SIGNIFICANT CHANGE UP (ref 11.5–15.5)
LYMPHOCYTES # BLD AUTO: 1.28 K/UL — SIGNIFICANT CHANGE UP (ref 1–3.3)
LYMPHOCYTES # BLD AUTO: 5.3 % — LOW (ref 13–44)
MANUAL SMEAR VERIFICATION: SIGNIFICANT CHANGE UP
MCHC RBC-ENTMCNC: 31.4 PG — SIGNIFICANT CHANGE UP (ref 27–34)
MCHC RBC-ENTMCNC: 32.7 GM/DL — SIGNIFICANT CHANGE UP (ref 32–36)
MCV RBC AUTO: 96.2 FL — SIGNIFICANT CHANGE UP (ref 80–100)
MONOCYTES # BLD AUTO: 1.31 K/UL — HIGH (ref 0–0.9)
MONOCYTES NFR BLD AUTO: 5.4 % — SIGNIFICANT CHANGE UP (ref 2–14)
NEUTROPHILS # BLD AUTO: 21.6 K/UL — HIGH (ref 1.8–7.4)
NEUTROPHILS NFR BLD AUTO: 89.3 % — HIGH (ref 43–77)
PLAT MORPH BLD: NORMAL — SIGNIFICANT CHANGE UP
PLATELET # BLD AUTO: 312 K/UL — SIGNIFICANT CHANGE UP (ref 150–400)
POTASSIUM SERPL-MCNC: 4.2 MMOL/L — SIGNIFICANT CHANGE UP (ref 3.5–5.3)
POTASSIUM SERPL-SCNC: 4.2 MMOL/L — SIGNIFICANT CHANGE UP (ref 3.5–5.3)
RBC # BLD: 4.2 M/UL — SIGNIFICANT CHANGE UP (ref 3.8–5.2)
RBC # FLD: 12.3 % — SIGNIFICANT CHANGE UP (ref 10.3–14.5)
RBC BLD AUTO: NORMAL — SIGNIFICANT CHANGE UP
SMUDGE CELLS # BLD: PRESENT — SIGNIFICANT CHANGE UP
SODIUM SERPL-SCNC: 137 MMOL/L — SIGNIFICANT CHANGE UP (ref 135–145)
WBC # BLD: 24.19 K/UL — HIGH (ref 3.8–10.5)
WBC # FLD AUTO: 24.19 K/UL — HIGH (ref 3.8–10.5)

## 2021-03-19 RX ORDER — ACETAMINOPHEN 500 MG
975 TABLET ORAL EVERY 6 HOURS
Refills: 0 | Status: DISCONTINUED | OUTPATIENT
Start: 2021-03-19 | End: 2021-03-20

## 2021-03-19 RX ORDER — OXYCODONE HYDROCHLORIDE 5 MG/1
10 TABLET ORAL EVERY 6 HOURS
Refills: 0 | Status: DISCONTINUED | OUTPATIENT
Start: 2021-03-19 | End: 2021-03-20

## 2021-03-19 RX ORDER — CETIRIZINE HYDROCHLORIDE 10 MG/1
0 TABLET ORAL
Qty: 0 | Refills: 0 | DISCHARGE

## 2021-03-19 RX ORDER — IBUPROFEN 200 MG
600 TABLET ORAL EVERY 6 HOURS
Refills: 0 | Status: DISCONTINUED | OUTPATIENT
Start: 2021-03-19 | End: 2021-03-20

## 2021-03-19 RX ORDER — OXYCODONE HYDROCHLORIDE 5 MG/1
1 TABLET ORAL
Qty: 12 | Refills: 0
Start: 2021-03-19 | End: 2021-03-21

## 2021-03-19 RX ORDER — OXYCODONE HYDROCHLORIDE 5 MG/1
5 TABLET ORAL EVERY 6 HOURS
Refills: 0 | Status: DISCONTINUED | OUTPATIENT
Start: 2021-03-19 | End: 2021-03-20

## 2021-03-19 RX ADMIN — HYDROMORPHONE HYDROCHLORIDE 30 MILLILITER(S): 2 INJECTION INTRAMUSCULAR; INTRAVENOUS; SUBCUTANEOUS at 07:05

## 2021-03-19 RX ADMIN — Medication 600 MILLIGRAM(S): at 17:33

## 2021-03-19 RX ADMIN — Medication 30 MILLIGRAM(S): at 11:29

## 2021-03-19 RX ADMIN — Medication 30 MILLIGRAM(S): at 11:40

## 2021-03-19 RX ADMIN — Medication 30 MILLIGRAM(S): at 05:41

## 2021-03-19 RX ADMIN — Medication 30 MILLIGRAM(S): at 06:00

## 2021-03-19 RX ADMIN — Medication 30 MILLIGRAM(S): at 00:09

## 2021-03-19 RX ADMIN — ENOXAPARIN SODIUM 40 MILLIGRAM(S): 100 INJECTION SUBCUTANEOUS at 23:26

## 2021-03-19 RX ADMIN — Medication 975 MILLIGRAM(S): at 20:53

## 2021-03-19 RX ADMIN — Medication 30 MILLIGRAM(S): at 00:30

## 2021-03-19 RX ADMIN — Medication 600 MILLIGRAM(S): at 23:26

## 2021-03-19 RX ADMIN — Medication 600 MILLIGRAM(S): at 18:30

## 2021-03-19 NOTE — DISCHARGE NOTE PROVIDER - HOSPITAL COURSE
Patient admitted for [facility] BSLO for fibroid uterus. Her hospital course was uncomplicated. On day of discharge her pain was well-controlled, she was ambulating, voiding, tolerating PO.

## 2021-03-19 NOTE — PROGRESS NOTE ADULT - SUBJECTIVE AND OBJECTIVE BOX
GYNECOLOGIC ONCOLOGY PROGRESS NOTE    POD#1    PROBLEMS:  Leiomyoma of uterus    Pt seen and examined at bedside.     SUBJECTIVE:    Patient is without complaints.  Pain well-controlled with PCA.  Flatus: yes.  Denies Nausea, Vomiting or Diarrhea.  Denies shortness of breath, chest pain or dyspnea on exertion.  Tolerating diet.    OBJECTIVE:     VITALS:  T(F): 98 (03-19-21 @ 08:25), Max: 98.8 (03-18-21 @ 23:12)  HR: 67 (03-19-21 @ 08:25) (67 - 92)  BP: 116/66 (03-19-21 @ 08:25) (103/62 - 130/56)  RR: 18 (03-19-21 @ 08:25) (10 - 18)  SpO2: 96% (03-19-21 @ 08:25) (95% - 100%)    I&O's Summary    18 Mar 2021 07:01  -  19 Mar 2021 07:00  --------------------------------------------------------  IN: 150 mL / OUT: 2870 mL / NET: -2720 mL    19 Mar 2021 07:01  -  19 Mar 2021 09:37  --------------------------------------------------------  IN: 0 mL / OUT: 400 mL / NET: -400 mL    MEDICATIONS  (STANDING):  ceFAZolin   IVPB 2000 milliGRAM(s) IV Intermittent once  dextrose 5% + sodium chloride 0.45% with potassium chloride 20 mEq/L 1000 milliLiter(s) (125 mL/Hr) IV Continuous <Continuous>  enoxaparin Injectable 40 milliGRAM(s) SubCutaneous daily  HYDROmorphone PCA (1 mG/mL) 30 milliLiter(s) PCA Continuous PCA Continuous  ketorolac   Injectable 30 milliGRAM(s) IV Push every 6 hours  metroNIDAZOLE  IVPB 500 milliGRAM(s) IV Intermittent once    MEDICATIONS  (PRN):  acetaminophen   Tablet .. 975 milliGRAM(s) Oral every 6 hours PRN Mild Pain (1 - 3)  naloxone Injectable 0.1 milliGRAM(s) IV Push every 3 minutes PRN For ANY of the following changes in patient status:  A. RR LESS THAN 10 breaths per minute, B. Oxygen saturation LESS THAN 90%, C. Sedation score of 6  ondansetron Injectable 4 milliGRAM(s) IV Push every 6 hours PRN Nausea and/or Vomiting    Physical Exam:  Constitutional: NAD  Pulmonary: clear to auscultation bilaterally   Cardiovascular: Regular rate and rhythm   Abdomen: soft, non-tender, non-distended, normal bowel sounds  Extremities: no lower extremity edema or calf tenderness, Torito's sign negative.  Incision: Clean, dry, intact.  Without signs of infection or hernia.    LABS:                        13.2   24.19 )-----------( 312      ( 19 Mar 2021 06:33 )             40.4     03-19    137  |  105  |  8.0  ----------------------------<  157<H>  4.2   |  20.0<L>  |  0.59    Ca    8.2<L>      19 Mar 2021 06:33           GYNECOLOGIC ONCOLOGY PROGRESS NOTE    POD#1    PROBLEMS:  Leiomyoma of uterus    Pt seen and examined at bedside.     SUBJECTIVE:    Patient is without complaints.  Pain well-controlled with PCA.  Flatus: yes.  Denies Nausea, Vomiting or Diarrhea.  Denies shortness of breath, chest pain or dyspnea on exertion.  Tolerating diet.    OBJECTIVE:     VITALS:  T(F): 98 (03-19-21 @ 08:25), Max: 98.8 (03-18-21 @ 23:12)  HR: 67 (03-19-21 @ 08:25) (67 - 92)  BP: 116/66 (03-19-21 @ 08:25) (103/62 - 130/56)  RR: 18 (03-19-21 @ 08:25) (10 - 18)  SpO2: 96% (03-19-21 @ 08:25) (95% - 100%)    I&O's Summary    18 Mar 2021 07:01  -  19 Mar 2021 07:00  --------------------------------------------------------  IN: 150 mL / OUT: 2870 mL / NET: -2720 mL    19 Mar 2021 07:01  -  19 Mar 2021 09:37  --------------------------------------------------------  IN: 0 mL / OUT: 400 mL / NET: -400 mL    MEDICATIONS  (STANDING):  ceFAZolin   IVPB 2000 milliGRAM(s) IV Intermittent once  dextrose 5% + sodium chloride 0.45% with potassium chloride 20 mEq/L 1000 milliLiter(s) (125 mL/Hr) IV Continuous <Continuous>  enoxaparin Injectable 40 milliGRAM(s) SubCutaneous daily  HYDROmorphone PCA (1 mG/mL) 30 milliLiter(s) PCA Continuous PCA Continuous  ketorolac   Injectable 30 milliGRAM(s) IV Push every 6 hours  metroNIDAZOLE  IVPB 500 milliGRAM(s) IV Intermittent once    MEDICATIONS  (PRN):  acetaminophen   Tablet .. 975 milliGRAM(s) Oral every 6 hours PRN Mild Pain (1 - 3)  naloxone Injectable 0.1 milliGRAM(s) IV Push every 3 minutes PRN For ANY of the following changes in patient status:  A. RR LESS THAN 10 breaths per minute, B. Oxygen saturation LESS THAN 90%, C. Sedation score of 6  ondansetron Injectable 4 milliGRAM(s) IV Push every 6 hours PRN Nausea and/or Vomiting    Physical Exam:  Constitutional: NAD  Pulmonary: clear to auscultation bilaterally   Cardiovascular: Regular rate and rhythm   Abdomen: soft, non-tender, non-distended, normal bowel sounds  Extremities: no lower extremity edema or calf tenderness, Torito's sign negative.  Incision: Clean, dry, intact.  Without signs of infection or hernia    LABS:                        13.2   24.19 )-----------( 312      ( 19 Mar 2021 06:33 )             40.4     03-19    137  |  105  |  8.0  ----------------------------<  157<H>  4.2   |  20.0<L>  |  0.59    Ca    8.2<L>      19 Mar 2021 06:33

## 2021-03-19 NOTE — CHART NOTE - NSCHARTNOTEFT_GEN_A_CORE
Patient seen at bedside for PM rounds  She is doing well, no acute complaints  Small amount of bleeding around incision, appears old blood on our evaluation  Pain is well controlled  She is voiding, eating, ambulating  Plan for d/c tomorrow

## 2021-03-19 NOTE — DISCHARGE NOTE PROVIDER - NSDCFUADDINST_GEN_ALL_CORE_FT
Please contact your provider for any pain uncontrolled by medication, excessive bleeding or Fever>100.4  Please take naproxen-1 tablet every 12 hours x 3 days, may take percocet as prescribed for breakthrough pain.    May walk and climb stairs as often as youd like, no vigorous activity, do not lift anything greater than 10lbs, nothing per vagina x 6 weeks, do not drive while on pain medication.

## 2021-03-19 NOTE — DISCHARGE NOTE PROVIDER - NSDCCPCAREPLAN_GEN_ALL_CORE_FT
PRINCIPAL DISCHARGE DIAGNOSIS  Diagnosis: Leiomyoma of uterus  Assessment and Plan of Treatment:

## 2021-03-19 NOTE — DISCHARGE NOTE PROVIDER - NSDCMRMEDTOKEN_GEN_ALL_CORE_FT
naproxen 500 mg oral tablet: 1 tab(s) orally 2 times a day, As Needed -for moderate pain   oxyCODONE 5 mg oral tablet: 1 tab(s) orally every 6 hours, As Needed -for severe pain MDD:4 tabs

## 2021-03-19 NOTE — DISCHARGE NOTE NURSING/CASE MANAGEMENT/SOCIAL WORK - PATIENT PORTAL LINK FT
You can access the FollowMyHealth Patient Portal offered by [facility] by registering at the following website: [email or web address]. By joining [employer]’s FollowMyHealth portal, you will also be able to view your health information using other applications (apps) compatible with our system.

## 2021-03-19 NOTE — PROGRESS NOTE ADULT - ASSESSMENT
39yo POD#1 s/p DANA BSLO for fibroid uterus w/ adherent L ovary.     Cardiac: No cardiac issues, BP well controlled   Pulmonary: no active disease, incentive spirometer at bedside.   Neuro: pain well controlled with PCA, will transition to PO meds this AM.  Endo: no active disease    GI: tolerating regular diet   : urena discontinued, patient voiding spontaneously   ID: no active disease, WBC 12->24, likely post-operatively, will repeat in AM  Heme: SCDs when not ambulating, Lovenox for VTE prophylaxis. AM labs.   Skin: no active disease   Psych: no active problems   FEN: IVF to discontinue this AM   Dispo: anticipate d/c in AM

## 2021-03-19 NOTE — DISCHARGE NOTE PROVIDER - CARE PROVIDER_API CALL
Liz Collier)  [facility]  [address] [place] [address]  Phone: [phone]  Fax: [phone]  Follow Up Time:

## 2021-03-19 NOTE — DISCHARGE NOTE PROVIDER - NSDCFUADDAPPT_GEN_ALL_CORE_FT
Please follow-up with PA in one week for removal of 1/2 staples and postop check  Follow-up with Dr. Collier in two weeks to review pathology report

## 2021-03-20 VITALS
RESPIRATION RATE: 18 BRPM | DIASTOLIC BLOOD PRESSURE: 77 MMHG | TEMPERATURE: 98 F | HEART RATE: 73 BPM | SYSTOLIC BLOOD PRESSURE: 117 MMHG | OXYGEN SATURATION: 99 %

## 2021-03-20 LAB
ANION GAP SERPL CALC-SCNC: 11 MMOL/L — SIGNIFICANT CHANGE UP (ref 5–17)
BUN SERPL-MCNC: 8 MG/DL — SIGNIFICANT CHANGE UP (ref 8–20)
CALCIUM SERPL-MCNC: 7.9 MG/DL — LOW (ref 8.6–10.2)
CHLORIDE SERPL-SCNC: 109 MMOL/L — HIGH (ref 98–107)
CO2 SERPL-SCNC: 22 MMOL/L — SIGNIFICANT CHANGE UP (ref 22–29)
CREAT SERPL-MCNC: 0.55 MG/DL — SIGNIFICANT CHANGE UP (ref 0.5–1.3)
GLUCOSE SERPL-MCNC: 85 MG/DL — SIGNIFICANT CHANGE UP (ref 70–99)
HCT VFR BLD CALC: 37.3 % — SIGNIFICANT CHANGE UP (ref 34.5–45)
HGB BLD-MCNC: 12.2 G/DL — SIGNIFICANT CHANGE UP (ref 11.5–15.5)
MCHC RBC-ENTMCNC: 31.4 PG — SIGNIFICANT CHANGE UP (ref 27–34)
MCHC RBC-ENTMCNC: 32.7 GM/DL — SIGNIFICANT CHANGE UP (ref 32–36)
MCV RBC AUTO: 96.1 FL — SIGNIFICANT CHANGE UP (ref 80–100)
PLATELET # BLD AUTO: 251 K/UL — SIGNIFICANT CHANGE UP (ref 150–400)
POTASSIUM SERPL-MCNC: 4.1 MMOL/L — SIGNIFICANT CHANGE UP (ref 3.5–5.3)
POTASSIUM SERPL-SCNC: 4.1 MMOL/L — SIGNIFICANT CHANGE UP (ref 3.5–5.3)
RBC # BLD: 3.88 M/UL — SIGNIFICANT CHANGE UP (ref 3.8–5.2)
RBC # FLD: 13.2 % — SIGNIFICANT CHANGE UP (ref 10.3–14.5)
SODIUM SERPL-SCNC: 142 MMOL/L — SIGNIFICANT CHANGE UP (ref 135–145)
WBC # BLD: 12.06 K/UL — HIGH (ref 3.8–10.5)
WBC # FLD AUTO: 12.06 K/UL — HIGH (ref 3.8–10.5)

## 2021-03-20 PROCEDURE — 85025 COMPLETE CBC W/AUTO DIFF WBC: CPT

## 2021-03-20 PROCEDURE — 88307 TISSUE EXAM BY PATHOLOGIST: CPT

## 2021-03-20 PROCEDURE — 80048 BASIC METABOLIC PNL TOTAL CA: CPT

## 2021-03-20 PROCEDURE — 82962 GLUCOSE BLOOD TEST: CPT

## 2021-03-20 PROCEDURE — 36415 COLL VENOUS BLD VENIPUNCTURE: CPT

## 2021-03-20 PROCEDURE — C9399: CPT

## 2021-03-20 PROCEDURE — 88305 TISSUE EXAM BY PATHOLOGIST: CPT

## 2021-03-20 PROCEDURE — 85027 COMPLETE CBC AUTOMATED: CPT

## 2021-03-20 RX ADMIN — Medication 600 MILLIGRAM(S): at 12:15

## 2021-03-20 RX ADMIN — ENOXAPARIN SODIUM 40 MILLIGRAM(S): 100 INJECTION SUBCUTANEOUS at 12:01

## 2021-03-20 RX ADMIN — Medication 975 MILLIGRAM(S): at 09:05

## 2021-03-20 RX ADMIN — Medication 600 MILLIGRAM(S): at 12:01

## 2021-03-20 RX ADMIN — Medication 975 MILLIGRAM(S): at 09:40

## 2021-03-20 RX ADMIN — Medication 600 MILLIGRAM(S): at 00:33

## 2021-03-20 RX ADMIN — Medication 600 MILLIGRAM(S): at 05:33

## 2021-03-20 NOTE — PROGRESS NOTE ADULT - ASSESSMENT
39yo POD#2 s/p DANA BSLO for fibroid uterus w/ adherent L ovary.     Cardiac: No cardiac issues, BP well controlled   Pulmonary: no active disease, incentive spirometer at bedside.   Neuro: pain well controlled with oral medications.   Endo: no active disease    GI: tolerating regular diet   : Voiding spontaneously without difficultly   ID: no active disease, WBC 24-->12, downtrending. Afebrile.   Heme: SCDs when not ambulating, Lovenox for VTE prophylaxis. AM labs reviewed.  Skin: no active disease   Psych: no active problems   FEN: No electrolyte abnormalities   Dispo: discharge today

## 2021-03-20 NOTE — PROGRESS NOTE ADULT - SUBJECTIVE AND OBJECTIVE BOX
GYNECOLOGIC ONCOLOGY PROGRESS NOTE    POD#2    PROBLEMS:  Leiomyoma of uterus    Pt seen and examined at bedside.     SUBJECTIVE:  No acute events overnight.   Patient is without complaints and would like to go home today.  Pain well-controlled with PO medications  Flatus: yes and had 2 bowel movements overnight  Denies Nausea, Vomiting or Diarrhea.  Denies shortness of breath, chest pain or dyspnea on exertion.  Tolerating a regular diet.   Ambulating without assistance. Voiding spontaneously      OBJECTIVE:     Vital Signs Last 24 Hrs  T(C): 36.8 (20 Mar 2021 05:00), Max: 37.1 (19 Mar 2021 12:17)  T(F): 98.2 (20 Mar 2021 05:00), Max: 98.8 (19 Mar 2021 12:17)  HR: 72 (20 Mar 2021 05:00) (67 - 72)  BP: 110/68 (20 Mar 2021 05:00) (104/68 - 116/66)  RR: 18 (20 Mar 2021 05:00) (18 - 18)  SpO2: 97% (20 Mar 2021 05:00) (96% - 98%)      Physical Exam:  Constitutional: NAD  Pulmonary: clear to auscultation bilaterally   Cardiovascular: Regular rate and rhythm   Abdomen: soft, non-tender, non-distended, normal bowel sounds  Extremities: no lower extremity edema or calf tenderness, Torito's sign negative.  Incision: Clean, dry, intact with staples.  Without signs of infection or hernia    LABS:                                   12.2   12.06 )-----------( 251      ( 20 Mar 2021 06:54 )             37.3 ,                       13.2   24.19 )-----------( 312      ( 19 Mar 2021 06:33 )             40.4     03-19    137  |  105  |  8.0  ----------------------------<  157<H>  4.2   |  20.0<L>  |  0.59    Ca    8.2<L>      19 Mar 2021 06:33

## 2021-03-22 PROBLEM — Z87.19 PERSONAL HISTORY OF OTHER DISEASES OF THE DIGESTIVE SYSTEM: Chronic | Status: ACTIVE | Noted: 2021-03-05

## 2021-03-22 PROBLEM — C53.9 MALIGNANT NEOPLASM OF CERVIX UTERI, UNSPECIFIED: Chronic | Status: ACTIVE | Noted: 2021-03-05

## 2021-03-23 LAB — SURGICAL PATHOLOGY STUDY: SIGNIFICANT CHANGE UP

## 2021-03-30 ENCOUNTER — APPOINTMENT (OUTPATIENT)
Dept: GYNECOLOGIC ONCOLOGY | Facility: CLINIC | Age: 41
End: 2021-03-30
Payer: MEDICAID

## 2021-03-30 VITALS
BODY MASS INDEX: 30.31 KG/M2 | HEART RATE: 109 BPM | HEIGHT: 68 IN | RESPIRATION RATE: 16 BRPM | WEIGHT: 200 LBS | SYSTOLIC BLOOD PRESSURE: 125 MMHG | DIASTOLIC BLOOD PRESSURE: 82 MMHG | OXYGEN SATURATION: 99 %

## 2021-03-30 PROCEDURE — 99024 POSTOP FOLLOW-UP VISIT: CPT

## 2021-03-30 NOTE — REASON FOR VISIT
[Post Op] : post op visit [de-identified] : 3/18/21 [de-identified] : SHEA CORTEZ, LO [de-identified] : Pt presents for post-op check. She reports doing well and having no concerns today. She has no fevers/chills, no nausea/vomiting.

## 2021-03-30 NOTE — DISCUSSION/SUMMARY
[Clean] : was clean [Dry] : was dry [Intact] : was intact [Erythema] : was not erythematous [Ecchymosis] : was not ecchymotic [Seroma] : had no seroma [Staple Intact] : had staples intact [None] : had no drainage [Normal Skin] : normal appearance [Firm] : soft [Tender] : nontender [Abnormal Bowel Sounds] : normal bowel sounds [Rebound] : no rebound tenderness [Guarding] : no guarding [Incisional Hernia] : no incisional hernia [Mass] : no palpable mass [Doing Well] : is doing well [Excellent Pain Control] : has excellent pain control [No Sign of Infection] : is showing no signs of infection [FreeTextEntry1] : staples removed

## 2021-03-30 NOTE — ASSESSMENT
[FreeTextEntry1] : Pt is a 39 yo with history of microinvasive cervical cancer with bulk symptoms due to fibroids now s/p DANA, BS, LO. Pathology benign. Recovering well.

## 2021-04-22 ENCOUNTER — APPOINTMENT (OUTPATIENT)
Dept: GYNECOLOGIC ONCOLOGY | Facility: CLINIC | Age: 41
End: 2021-04-22
Payer: MEDICAID

## 2021-04-22 VITALS — WEIGHT: 220 LBS | BODY MASS INDEX: 33.34 KG/M2 | HEIGHT: 68 IN

## 2021-04-22 DIAGNOSIS — Z85.41 PERSONAL HISTORY OF MALIGNANT NEOPLASM OF CERVIX UTERI: ICD-10-CM

## 2021-04-22 DIAGNOSIS — D25.9 LEIOMYOMA OF UTERUS, UNSPECIFIED: ICD-10-CM

## 2021-04-22 PROCEDURE — 99024 POSTOP FOLLOW-UP VISIT: CPT

## 2021-04-22 NOTE — END OF VISIT
[FreeTextEntry3] : Follow up with primary gyn in 6 months\par RTC in 12 months for surveillance (5 years since diagnosis of microinvasive cervical cancer)

## 2021-04-22 NOTE — REASON FOR VISIT
[Post Op] : post op visit [de-identified] : SHEA CORTEZ, LO [de-identified] : 3/18/21 [de-identified] : Pt presents for post-op check. She reports doing well and having no concerns today. She has no fevers/chills, no nausea/vomiting.

## 2021-04-22 NOTE — ASSESSMENT
[FreeTextEntry1] : Pt is a 39 yo with history of microinvasive cervical cancer with bulk symptoms due to fibroids now s/p DANA, BS, LO. Pathology benign. Recovered well.

## 2021-08-08 NOTE — ASU PREOP CHECKLIST - WAS PATIENT ON BETA BLOCKER?
Patient with longstanding history of hypertension  - c/w home metoprolol 25mg TID  - will add on home diltiazem 60mg QID if BP tolerates No

## 2022-09-30 ENCOUNTER — NON-APPOINTMENT (OUTPATIENT)
Age: 42
End: 2022-09-30

## 2022-09-30 ENCOUNTER — APPOINTMENT (OUTPATIENT)
Dept: OPHTHALMOLOGY | Facility: CLINIC | Age: 42
End: 2022-09-30

## 2022-09-30 PROCEDURE — 92004 COMPRE OPH EXAM NEW PT 1/>: CPT

## 2022-11-21 ENCOUNTER — APPOINTMENT (OUTPATIENT)
Dept: OPHTHALMOLOGY | Facility: CLINIC | Age: 42
End: 2022-11-21

## 2024-01-16 NOTE — H&P PST ADULT - GUM GEN PE MLT EXAM PC
oriented to person, place and time, normal sensation, short and long term memory intact, sensory exam intact patient refused

## 2024-01-19 NOTE — H&P PST ADULT - GENITOURINARY
[FreeTextEntry1] : Hemodynamically stable with acceptable BP Lab profiles drawn in office and sent details…

## 2024-02-08 NOTE — H&P PST ADULT - WEIGHT IN LBS
Phone call to patient to discuss. Patient and son Anil on the line. Anil reports pain has not improved at all, but in fact has worsened. Patient has been taking the naproxen and Cyclobenzaprine consistently as ordered. Have verified pharmacy as [facility] on [address] as it is mentioned new Rx to be sent in. Explained PSP will be notified of the update. Patient will be called with her response. Stated understanding.        He has scheduled the MRI for 2/17/24.   220.4

## 2024-04-03 ENCOUNTER — NON-APPOINTMENT (OUTPATIENT)
Age: 44
End: 2024-04-03

## 2024-12-21 NOTE — H&P PST ADULT - LIVING CHILDREN, OB PROFILE
You can access the FollowMyHealth Patient Portal offered by [facility] by registering at the following website: [email or web address]. By joining [employer]’s FollowMyHealth portal, you will also be able to view your health information using other applications (apps) compatible with our system.
0